# Patient Record
Sex: FEMALE | Race: ASIAN | NOT HISPANIC OR LATINO
[De-identification: names, ages, dates, MRNs, and addresses within clinical notes are randomized per-mention and may not be internally consistent; named-entity substitution may affect disease eponyms.]

---

## 2023-08-21 ENCOUNTER — DASHBOARD ENCOUNTERS (OUTPATIENT)
Age: 43
End: 2023-08-21

## 2023-08-21 ENCOUNTER — OFFICE VISIT (OUTPATIENT)
Dept: URBAN - METROPOLITAN AREA CLINIC 78 | Facility: CLINIC | Age: 43
End: 2023-08-21
Payer: COMMERCIAL

## 2023-08-21 VITALS
DIASTOLIC BLOOD PRESSURE: 78 MMHG | TEMPERATURE: 98.1 F | BODY MASS INDEX: 22.02 KG/M2 | HEART RATE: 67 BPM | HEIGHT: 64 IN | SYSTOLIC BLOOD PRESSURE: 118 MMHG | RESPIRATION RATE: 16 BRPM | WEIGHT: 129 LBS

## 2023-08-21 DIAGNOSIS — K64.4 EXTERNAL HEMORRHOIDS: ICD-10-CM

## 2023-08-21 DIAGNOSIS — K62.89 RECTAL OR ANAL PAIN: ICD-10-CM

## 2023-08-21 PROCEDURE — 99203 OFFICE O/P NEW LOW 30 MIN: CPT | Performed by: INTERNAL MEDICINE

## 2023-08-21 NOTE — HPI-TODAY'S VISIT:
Patient was referred by  Gynae with [facility]  A copy of this document will be sent to the physician.   Patient is a 43-year-old female seen primarily for rectal pain.  She states that since the childbirth last  3 years ago she has noticed an external hemorrhoids.  Recently after a family event through a stomach bug her symptoms have worsened.  She has an external bump but her predominant complaint is pain for which she is taking ibuprofen.  Denies rectal bleeding occasional itching she reports normal bowel movements recalls a history of constipation in the setting of narcotic use post .   Denies rectal bleeding  Denies melena  Denies hematochezia  Denies change in bowel habits

## 2023-08-21 NOTE — PHYSICAL EXAM RECTAL:
Declined chaperone . normal tone external hemorrhoids non thrombosed hemorrhoid present with skin tag  no stool to guaiac-

## 2024-06-17 ENCOUNTER — OFFICE VISIT (OUTPATIENT)
Dept: URBAN - METROPOLITAN AREA CLINIC 92 | Facility: CLINIC | Age: 44
End: 2024-06-17
Payer: COMMERCIAL

## 2024-06-17 VITALS
DIASTOLIC BLOOD PRESSURE: 69 MMHG | WEIGHT: 124 LBS | HEIGHT: 64 IN | SYSTOLIC BLOOD PRESSURE: 110 MMHG | HEART RATE: 76 BPM | TEMPERATURE: 97.3 F | BODY MASS INDEX: 21.17 KG/M2

## 2024-06-17 DIAGNOSIS — K64.1 GRADE II HEMORRHOIDS: ICD-10-CM

## 2024-06-17 DIAGNOSIS — K62.89 RECTAL PAIN: ICD-10-CM

## 2024-06-17 PROCEDURE — 99214 OFFICE O/P EST MOD 30 MIN: CPT | Performed by: INTERNAL MEDICINE

## 2024-06-17 PROCEDURE — 46221 LIGATION OF HEMORRHOID(S): CPT | Performed by: INTERNAL MEDICINE

## 2024-06-17 PROCEDURE — 25 SEPARATE E/M: Performed by: INTERNAL MEDICINE

## 2024-06-17 RX ORDER — BLOOD-GLUCOSE SENSOR
1 DEVICE(S) FOR 14 DAY(S), ROUTE EACH MISCELLANEOUS
Qty: 1 EACH | Refills: 0 | COMMUNITY

## 2024-06-17 RX ORDER — DOXYCYCLINE HYCLATE 100 MG/1
CAPSULE ORAL
Qty: 14 CAPSULE | COMMUNITY

## 2024-06-17 RX ORDER — PREDNISONE 20 MG/1
TAKE 2 TABLETS BY MOUTH IN THE MORNING FOR 5 DAYS TABLET ORAL
Qty: 10 EACH | Refills: 0 | COMMUNITY

## 2024-06-17 NOTE — EXAM-PHYSICAL EXAM
CONSTITUTIONAL - well-developed, well-nourished, NAD HEENT - sclerae and conjuntivae appear normal, external nose normal appearance, no nasal discharge NECK - normal appearance, no deformities CHEST - no increased work of breathing, no accessory muscle use CV - no edema, regular rate GI - soft, NTND, no guarding or rigidity, no palpable masses or HSM MSK - no deformities, normal gait SKIN - good turgor, no obvious rashes NEURO - AAOx3 PSYCH - normal mood and appropriate affect  RECTAL - external hem at 6 oclock that prolpases in and out

## 2024-06-17 NOTE — HPI-TODAY'S VISIT:
Patient was referred by  Gynae with [facility]  A copy of this document will be sent to the physician.   Patient is a 43-year-old female seen primarily for rectal pain.  She states that since the childbirth last  3 years ago she has noticed an external hemorrhoids.  Recently after a family event through a stomach bug her symptoms have worsened.  She has an external bump but her predominant complaint is pain for which she is taking ibuprofen.  Denies rectal bleeding occasional itching she reports normal bowel movements recalls a history of constipation in the setting of narcotic use post .  ***2024: Notes an external hemorrhoid that itches, occasional discomfort, has to take ibuprofen for it. No bleeding. Has tried topical therapy.

## 2024-07-22 ENCOUNTER — OFFICE VISIT (OUTPATIENT)
Dept: URBAN - METROPOLITAN AREA CLINIC 92 | Facility: CLINIC | Age: 44
End: 2024-07-22
Payer: COMMERCIAL

## 2024-07-22 ENCOUNTER — LAB OUTSIDE AN ENCOUNTER (OUTPATIENT)
Dept: URBAN - METROPOLITAN AREA CLINIC 92 | Facility: CLINIC | Age: 44
End: 2024-07-22

## 2024-07-22 VITALS
SYSTOLIC BLOOD PRESSURE: 111 MMHG | WEIGHT: 132 LBS | DIASTOLIC BLOOD PRESSURE: 71 MMHG | TEMPERATURE: 96.6 F | HEART RATE: 67 BPM | BODY MASS INDEX: 22.53 KG/M2 | HEIGHT: 64 IN

## 2024-07-22 DIAGNOSIS — K64.1 GRADE II HEMORRHOIDS: ICD-10-CM

## 2024-07-22 PROCEDURE — 46221 LIGATION OF HEMORRHOID(S): CPT | Performed by: INTERNAL MEDICINE

## 2024-07-22 RX ORDER — HYDROCORTISONE ACETATE 25 MG/1
1 SUPPOSITORY SUPPOSITORY RECTAL TWICE A DAY
Qty: 28 SUPPOSITORIES | Refills: 1 | OUTPATIENT
Start: 2024-07-22 | End: 2024-08-19

## 2024-07-22 RX ORDER — PREDNISONE 20 MG/1
TAKE 2 TABLETS BY MOUTH IN THE MORNING FOR 5 DAYS TABLET ORAL
Qty: 10 EACH | Refills: 0 | Status: ON HOLD | COMMUNITY

## 2024-07-22 RX ORDER — DOXYCYCLINE HYCLATE 100 MG/1
CAPSULE ORAL
Qty: 14 CAPSULE | Status: ON HOLD | COMMUNITY

## 2024-07-22 RX ORDER — BLOOD-GLUCOSE SENSOR
1 DEVICE(S) FOR 14 DAY(S), ROUTE EACH MISCELLANEOUS
Qty: 1 EACH | Refills: 0 | Status: ON HOLD | COMMUNITY

## 2024-08-07 ENCOUNTER — OFFICE VISIT (OUTPATIENT)
Dept: URBAN - METROPOLITAN AREA CLINIC 92 | Facility: CLINIC | Age: 44
End: 2024-08-07
Payer: COMMERCIAL

## 2024-08-07 VITALS
TEMPERATURE: 97 F | DIASTOLIC BLOOD PRESSURE: 73 MMHG | WEIGHT: 132 LBS | BODY MASS INDEX: 22.53 KG/M2 | HEART RATE: 74 BPM | SYSTOLIC BLOOD PRESSURE: 112 MMHG | HEIGHT: 64 IN

## 2024-08-07 DIAGNOSIS — K64.1 GRADE II HEMORRHOIDS: ICD-10-CM

## 2024-08-07 PROCEDURE — 46221 LIGATION OF HEMORRHOID(S): CPT | Performed by: INTERNAL MEDICINE

## 2024-08-07 RX ORDER — HYDROCORTISONE ACETATE 25 MG/1
1 SUPPOSITORY SUPPOSITORY RECTAL TWICE A DAY
Qty: 28 SUPPOSITORIES | Refills: 1 | Status: ACTIVE | COMMUNITY
Start: 2024-07-22 | End: 2024-08-19

## 2024-08-07 RX ORDER — PREDNISONE 20 MG/1
TAKE 2 TABLETS BY MOUTH IN THE MORNING FOR 5 DAYS TABLET ORAL
Qty: 10 EACH | Refills: 0 | Status: ON HOLD | COMMUNITY

## 2024-08-07 RX ORDER — DOXYCYCLINE HYCLATE 100 MG/1
CAPSULE ORAL
Qty: 14 CAPSULE | Status: ON HOLD | COMMUNITY

## 2024-08-07 RX ORDER — BLOOD-GLUCOSE SENSOR
1 DEVICE(S) FOR 14 DAY(S), ROUTE EACH MISCELLANEOUS
Qty: 1 EACH | Refills: 0 | Status: ON HOLD | COMMUNITY

## 2024-08-07 RX ORDER — HYDROCORTISONE ACETATE 25 MG/1
1 SUPPOSITORY SUPPOSITORY RECTAL ONCE A DAY
Qty: 14 | Refills: 3 | OUTPATIENT
Start: 2024-08-07 | End: 2024-10-02

## 2024-08-09 ENCOUNTER — TELEPHONE ENCOUNTER (OUTPATIENT)
Dept: URBAN - METROPOLITAN AREA CLINIC 92 | Facility: CLINIC | Age: 44
End: 2024-08-09

## 2024-08-09 RX ORDER — HYDROCORTISONE ACETATE 25 MG/1
1 SUPPOSITORY SUPPOSITORY RECTAL TWICE A DAY
Qty: 28 SUPPOSITORIES | Refills: 1
Start: 2024-07-22 | End: 2024-09-06

## 2024-08-13 ENCOUNTER — WEB ENCOUNTER (OUTPATIENT)
Dept: URBAN - METROPOLITAN AREA CLINIC 92 | Facility: CLINIC | Age: 44
End: 2024-08-13

## 2024-09-12 ENCOUNTER — CLAIMS CREATED FROM THE CLAIM WINDOW (OUTPATIENT)
Dept: URBAN - METROPOLITAN AREA SURGERY CENTER 16 | Facility: SURGERY CENTER | Age: 44
End: 2024-09-12
Payer: COMMERCIAL

## 2024-09-12 DIAGNOSIS — Z12.11 COLON CANCER SCREENING (HIGH RISK): ICD-10-CM

## 2024-09-12 DIAGNOSIS — K64.8 EXTERNAL HEMORRHOIDS: ICD-10-CM

## 2024-09-12 DIAGNOSIS — K59.00 CONSTIPATION: ICD-10-CM

## 2024-09-12 DIAGNOSIS — K62.89 RECTAL PAIN: ICD-10-CM

## 2024-09-12 DIAGNOSIS — Z12.11 COLON CANCER SCREENING: ICD-10-CM

## 2024-09-12 PROCEDURE — G0121 COLON CA SCRN NOT HI RSK IND: HCPCS | Performed by: INTERNAL MEDICINE

## 2024-09-12 PROCEDURE — 0528F RCMND FLW-UP 10 YRS DOCD: CPT | Performed by: INTERNAL MEDICINE

## 2024-09-12 PROCEDURE — 00812 ANES LWR INTST SCR COLSC: CPT | Performed by: ANESTHESIOLOGY

## 2024-09-12 PROCEDURE — 00812 ANES LWR INTST SCR COLSC: CPT | Performed by: NURSE ANESTHETIST, CERTIFIED REGISTERED

## 2024-09-12 RX ORDER — HYDROCORTISONE ACETATE 25 MG/1
1 SUPPOSITORY SUPPOSITORY RECTAL ONCE A DAY
Qty: 14 | Refills: 3 | Status: ACTIVE | COMMUNITY
Start: 2024-08-07 | End: 2024-10-02

## 2024-09-12 RX ORDER — BLOOD-GLUCOSE SENSOR
1 DEVICE(S) FOR 14 DAY(S), ROUTE EACH MISCELLANEOUS
Qty: 1 EACH | Refills: 0 | Status: ON HOLD | COMMUNITY

## 2024-09-12 RX ORDER — PREDNISONE 20 MG/1
TAKE 2 TABLETS BY MOUTH IN THE MORNING FOR 5 DAYS TABLET ORAL
Qty: 10 EACH | Refills: 0 | Status: ON HOLD | COMMUNITY

## 2024-09-12 RX ORDER — DOXYCYCLINE HYCLATE 100 MG/1
CAPSULE ORAL
Qty: 14 CAPSULE | Status: ON HOLD | COMMUNITY